# Patient Record
(demographics unavailable — no encounter records)

---

## 2025-06-24 NOTE — FAMILY HISTORY
[Age 35+ During Pregnancy] : not 35 or over during pregnancy [Reported Family History Of Birth Defects] : no congenital heart defects [Manav-Sachs Carrier] : no Manav-Sachs [Family History] : no mental retardation/autism [Reported Family History Of Genetic Disease] : no history of child defect in child of baby father

## 2025-06-24 NOTE — ACTIVE PROBLEMS
[Diabetes Mellitus] : no diabetes mellitus [Heart Disease] : no heart disease [Hypertension] : no hypertension [Autoimmune Disease] : no autoimmune disease [Renal Disease] : no kidney disease, no UTI [Neurologic Disorder] : no neurologic disorder, no epilepsy [Psychiatric Disorders] : no psychiatric disorders [Depression] : no depression, no post partum depression [Hepatic Disorder] : no hepatitis, no liver disease [Thyroid Disorder] : no thyroid dysfunction [Thrombophlebitis] : no varicosities, no phlebitis [Trauma] : no trauma/violence [Blood Transfusion (___ Ml)] : no history of blood transfusion

## 2025-06-24 NOTE — VITALS
[US Date: ___] : ultrasound performed on [unfilled]. [GA= ___ Weeks] : Results were GA of [unfilled] weeks [GA= ___ Days] : and [unfilled] day(s) [LEE by US (date): ___] : The calculated LEE by US is [unfilled] [By US] : this is the final LEE

## 2025-06-25 NOTE — DISCUSSION/SUMMARY
[FreeTextEntry1] :  We had the pleasure of seeing Ms. Dooley for a Maternal-Fetal Medicine consultation today. She is a 44yo  at 39 weeks and 0 days of gestation with a history of polyhydramnios, LGA, and fetal enlarged cisterna magna.   LGA is defined as a birthweight > 90th percentile for gestational age and macrosomia as an absolute birthweight greater than 4,000 or 4,500 grams. Pre-existing diabetes, gestational diabetes, maternal obesity, excessive gestational weight gain, prior macrosomic , and post-term pregnancy are risk factors for fetal macrosomia. She had normal diabetes screening in April. LGA/fetal macrosomia is associated with increased maternal and fetal risks including increased risk of  section, labor protraction and arrest, postpartum hemorrhage, shoulder dystocia,  hypoglycemia, and NICU admission. Planned  birth for suspected fetal macrosomia may be beneficial for newborns with suspected macrosomia who have an estimated fetal weight of at least 5,000 grams in women without diabetes (which is not the case for this patient, so she can proceed with induction of labor).   Polyhydramnios is defined as a single deepest pocket > 8 cm or SARAH BETH > 95th percentile (SARAH BETH >24 cm).  Severe polyhydramnios is a SDP >15 cm or an SARAH BETH >35.1cm.  Major associations are diabetes and fetal malformations, but up to 50% of mild polyhydramnios cases are idiopathic.  Fetal malformations include neurologic defects that can impair swallowing, or gastrointestinal obstructive lesions. Polyhydramnios is associated with higher rates of macrosomia, malpresentation, cord prolapse, abruption, primary  section, and uterine atony.  We recommend delivery in the 39th week of gestation.  An enlarged cisterna magna is defined as measuring greater than 10mm. The differential diagnoses in patients with a large cisterna magna include cathy cisterna magna, retro-cerebellar arachnoid cyst, DWM, and other conditions affecting the vermis.  Although difficult to establish using US or MRI, a morphologically normal vermis reduces the differential diagnoses to only two entities: cathy cisterna magna or retro-cerebellar arachnoid cyst. She had no abnormalities seen at the time of her anatomy ultrasound, with a normal posterior fossa at that time. Detailed imaging of the fetal brain in the late 3rd trimester is limited by fetal position. When isolated, MCM has a favorable neurodevelopmental outcome most cases.   However, in the setting of this finding and polyhydramnios, the  should have a thorough neurologic examination by a pediatrician at the time of delivery.  Imaging may be warranted pending examination. We discussed the potential benefits of delivery in a facility with at least a level II NICU and 24-hour neonatology coverage so that the baby can be evaluated at the time of delivery and to minimize the possible need for a  transfer for  evaluation. The patient wishes to deliver at a facility with this capability. We will help facilitate scheduling an induction of labor for her this week at [facility].  At the end of our discussion, the patient indicated that her questions were answered, and she seemed satisfied with our discussion. Please do not hesitate to contact us with any questions.    Sincerely,  Rima Rollins MD  Fellow, Maternal-Fetal Medicine   Leann Linda MD   Attending Maternal-Fetal Medicine

## 2025-06-25 NOTE — HISTORY OF PRESENT ILLNESS
[FreeTextEntry1] : Lesley is doing well today. Denies contractions, LOF, or vaginal bleeding. Reports normal fetal movement. Pregnancy complicated by LGA and polyhydramnios. Polyhydramnios with SARAH BETH 32 at last ultrasound, 26cm today. There is also a finding of an enlarged cisterna magna on ultrasound which was not present at the time of her anatomy ultrasound.

## 2025-06-25 NOTE — CHIEF COMPLAINT
[G ___] : G [unfilled] [P ___] : P [unfilled] [de-identified] : polyhydramnios, LGA, enlarged fetal cisterna magna